# Patient Record
Sex: FEMALE | Race: WHITE | NOT HISPANIC OR LATINO | URBAN - METROPOLITAN AREA
[De-identification: names, ages, dates, MRNs, and addresses within clinical notes are randomized per-mention and may not be internally consistent; named-entity substitution may affect disease eponyms.]

---

## 2022-04-30 ENCOUNTER — TELEPHONE ENCOUNTER (OUTPATIENT)
Dept: URBAN - METROPOLITAN AREA CLINIC 121 | Facility: CLINIC | Age: 42
End: 2022-04-30

## 2022-05-01 ENCOUNTER — TELEPHONE ENCOUNTER (OUTPATIENT)
Dept: URBAN - METROPOLITAN AREA CLINIC 121 | Facility: CLINIC | Age: 42
End: 2022-05-01

## 2022-05-09 ENCOUNTER — DASHBOARD ENCOUNTERS (OUTPATIENT)
Age: 42
End: 2022-05-09

## 2022-05-09 ENCOUNTER — WEB ENCOUNTER (OUTPATIENT)
Dept: URBAN - METROPOLITAN AREA CLINIC 27 | Facility: CLINIC | Age: 42
End: 2022-05-09

## 2022-05-09 ENCOUNTER — CLAIMS CREATED FROM THE CLAIM WINDOW (OUTPATIENT)
Dept: URBAN - METROPOLITAN AREA CLINIC 27 | Facility: CLINIC | Age: 42
End: 2022-05-09
Payer: COMMERCIAL

## 2022-05-09 VITALS
DIASTOLIC BLOOD PRESSURE: 87 MMHG | WEIGHT: 170 LBS | HEIGHT: 64 IN | HEART RATE: 71 BPM | BODY MASS INDEX: 29.02 KG/M2 | SYSTOLIC BLOOD PRESSURE: 120 MMHG

## 2022-05-09 DIAGNOSIS — K21.9 GASTROESOPHAGEAL REFLUX DISEASE, UNSPECIFIED WHETHER ESOPHAGITIS PRESENT: ICD-10-CM

## 2022-05-09 DIAGNOSIS — K30 ACID INDIGESTION: ICD-10-CM

## 2022-05-09 DIAGNOSIS — K62.5 RECTAL BLEEDING: ICD-10-CM

## 2022-05-09 DIAGNOSIS — A04.8 H. PYLORI INFECTION: ICD-10-CM

## 2022-05-09 PROBLEM — 235595009: Status: ACTIVE | Noted: 2022-05-09

## 2022-05-09 PROCEDURE — 99204 OFFICE O/P NEW MOD 45 MIN: CPT | Performed by: INTERNAL MEDICINE

## 2022-05-09 RX ORDER — PANTOPRAZOLE SODIUM 40 MG/1
1 TABLET TABLET, DELAYED RELEASE ORAL ONCE A DAY
Status: ACTIVE | COMMUNITY

## 2022-05-09 RX ORDER — PANTOPRAZOLE SODIUM 40 MG/1
1 TABLET TABLET, DELAYED RELEASE ORAL TWICE A DAY
Qty: 60 | Refills: 3 | OUTPATIENT
Start: 2022-05-09

## 2022-05-09 RX ORDER — METRONIDAZOLE 500 MG/1
1 TABLET TABLET ORAL THREE TIMES A DAY
Status: ACTIVE | COMMUNITY

## 2022-05-09 NOTE — PHYSICAL EXAM CHEST:
no lesions,  no deformities,  no traumatic injuries,  no significant scars are present,  no masses present, breathing is unlabored without accessory muscle use,normal breath sounds

## 2022-05-09 NOTE — HPI-TODAY'S VISIT:
Patient is a 41 YOF referred by Samantha Patel for GI evaluation; she has been on 2 round of treatment for H. pylori. Has chronic h/o UGI sx since adolescence. She has had worsening sx over the last year including nausea, vomiting in the AM, early satiety, epigastric pain, bloating. She was diagnosed w/H. pylori in January, underwent tx w/flagyl, clarithromycin, BID PPI x 2 wks. Sx improved after this x 6 wks, then returned. 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 pylori (same meds), somewhat improved but sx have not totally abated. No prior EGD. No regular NSAID use. She still has her gallbladder; had pain several yrs ago and US that showed "crystalization" but no stones. No dysphagia, but swallowing feels somewhat delayed. She is on pantoprazole 40mg QD. Prilosec was not helpful. She is moderately adherent to an anti-reflux diet. No worsening of sx w/fatty, greasy, fried foods.  She also c/o painless rectal bleeding x 3 months, sometimes fills the commode with bright red blood. She attributes this to hemorrhoids. She also c/o new-onset constipation; stools are soft but she feels like she's not emptying. No laxatives; she was using hemorrhoid cream when she had perianal discomfort which has now resolved. She lost 30 lbs w/first bout of H. pylori in January, has now regained that weight. She had quit smoking, recently restarted smoking. She drinks 1-3 alcoholic drinks nightly, smokes marijuana on occasion.  Colonoscopy 2013, inpt for eval of abd pain: normal  FH: MGM possibly had CRC; no polyps

## 2022-05-09 NOTE — PHYSICAL EXAM GASTROINTESTINAL
Abdomen , soft, mild epigastric TTP no rebound or guarding, nondistended , no guarding or rigidity , no masses palpable , normal bowel sounds , Liver and Spleen , no hepatomegaly present , no hepatosplenomegaly , liver nontender , spleen not palpable

## 2022-05-18 PROBLEM — 162031009: Status: ACTIVE | Noted: 2022-05-09

## 2022-05-18 PROBLEM — 12063002: Status: ACTIVE | Noted: 2022-05-09

## 2022-06-09 ENCOUNTER — OFFICE VISIT (OUTPATIENT)
Dept: URBAN - METROPOLITAN AREA SURGERY CENTER 7 | Facility: SURGERY CENTER | Age: 42
End: 2022-06-09